# Patient Record
Sex: FEMALE | Race: WHITE | NOT HISPANIC OR LATINO | Employment: OTHER | URBAN - METROPOLITAN AREA
[De-identification: names, ages, dates, MRNs, and addresses within clinical notes are randomized per-mention and may not be internally consistent; named-entity substitution may affect disease eponyms.]

---

## 2017-09-23 ENCOUNTER — HOSPITAL ENCOUNTER (EMERGENCY)
Facility: HOSPITAL | Age: 42
Discharge: PSYCHIATRIC OR SUBSTANCE USE FACILITY | End: 2017-09-23
Attending: EMERGENCY MEDICINE
Payer: MEDICAID

## 2017-09-23 VITALS
OXYGEN SATURATION: 100 % | TEMPERATURE: 99 F | HEART RATE: 74 BPM | BODY MASS INDEX: 32.1 KG/M2 | RESPIRATION RATE: 18 BRPM | HEIGHT: 61 IN | WEIGHT: 170 LBS | DIASTOLIC BLOOD PRESSURE: 86 MMHG | SYSTOLIC BLOOD PRESSURE: 120 MMHG

## 2017-09-23 DIAGNOSIS — R45.851 DEPRESSION WITH SUICIDAL IDEATION: Primary | ICD-10-CM

## 2017-09-23 DIAGNOSIS — F32.A DEPRESSION WITH SUICIDAL IDEATION: Primary | ICD-10-CM

## 2017-09-23 DIAGNOSIS — F31.9 BIPOLAR DISEASE, CHRONIC: ICD-10-CM

## 2017-09-23 LAB
ALBUMIN SERPL BCP-MCNC: 3.6 G/DL
ALP SERPL-CCNC: 69 U/L
ALT SERPL W/O P-5'-P-CCNC: 10 U/L
AMPHET+METHAMPHET UR QL: NEGATIVE
ANION GAP SERPL CALC-SCNC: 11 MMOL/L
APAP SERPL-MCNC: 11 UG/ML
AST SERPL-CCNC: 16 U/L
BARBITURATES UR QL SCN>200 NG/ML: NORMAL
BASOPHILS # BLD AUTO: 0.03 K/UL
BASOPHILS NFR BLD: 0.6 %
BENZODIAZ UR QL SCN>200 NG/ML: NORMAL
BILIRUB SERPL-MCNC: 0.4 MG/DL
BILIRUB UR QL STRIP: NEGATIVE
BUN SERPL-MCNC: 7 MG/DL
BZE UR QL SCN: NORMAL
CALCIUM SERPL-MCNC: 9.4 MG/DL
CANNABINOIDS UR QL SCN: NEGATIVE
CHLORIDE SERPL-SCNC: 106 MMOL/L
CLARITY UR: CLEAR
CO2 SERPL-SCNC: 23 MMOL/L
COLOR UR: YELLOW
CREAT SERPL-MCNC: 1.2 MG/DL
CREAT UR-MCNC: 171.5 MG/DL
DIFFERENTIAL METHOD: ABNORMAL
EOSINOPHIL # BLD AUTO: 0.2 K/UL
EOSINOPHIL NFR BLD: 5 %
ERYTHROCYTE [DISTWIDTH] IN BLOOD BY AUTOMATED COUNT: 16.4 %
EST. GFR  (AFRICAN AMERICAN): >60 ML/MIN/1.73 M^2
EST. GFR  (NON AFRICAN AMERICAN): 55 ML/MIN/1.73 M^2
ETHANOL SERPL-MCNC: <10 MG/DL
GLUCOSE SERPL-MCNC: 113 MG/DL
GLUCOSE UR QL STRIP: NEGATIVE
HCT VFR BLD AUTO: 43.4 %
HGB BLD-MCNC: 14.3 G/DL
HGB UR QL STRIP: NEGATIVE
KETONES UR QL STRIP: NEGATIVE
LEUKOCYTE ESTERASE UR QL STRIP: NEGATIVE
LYMPHOCYTES # BLD AUTO: 1.8 K/UL
LYMPHOCYTES NFR BLD: 37.6 %
MCH RBC QN AUTO: 27.6 PG
MCHC RBC AUTO-ENTMCNC: 32.9 G/DL
MCV RBC AUTO: 84 FL
METHADONE UR QL SCN>300 NG/ML: NEGATIVE
MONOCYTES # BLD AUTO: 0.5 K/UL
MONOCYTES NFR BLD: 9.6 %
NEUTROPHILS # BLD AUTO: 2.3 K/UL
NEUTROPHILS NFR BLD: 47.2 %
NITRITE UR QL STRIP: NEGATIVE
OPIATES UR QL SCN: NORMAL
PCP UR QL SCN>25 NG/ML: NEGATIVE
PH UR STRIP: 5 [PH] (ref 5–8)
PLATELET # BLD AUTO: 322 K/UL
PMV BLD AUTO: 9 FL
POTASSIUM SERPL-SCNC: 3.7 MMOL/L
PROT SERPL-MCNC: 7 G/DL
PROT UR QL STRIP: NEGATIVE
RBC # BLD AUTO: 5.19 M/UL
SODIUM SERPL-SCNC: 140 MMOL/L
SP GR UR STRIP: 1.02 (ref 1–1.03)
T4 FREE SERPL-MCNC: 0.47 NG/DL
TOXICOLOGY INFORMATION: NORMAL
TSH SERPL DL<=0.005 MIU/L-ACNC: 69.89 UIU/ML
URN SPEC COLLECT METH UR: NORMAL
UROBILINOGEN UR STRIP-ACNC: NEGATIVE EU/DL
WBC # BLD AUTO: 4.81 K/UL

## 2017-09-23 PROCEDURE — 84439 ASSAY OF FREE THYROXINE: CPT

## 2017-09-23 PROCEDURE — 85025 COMPLETE CBC W/AUTO DIFF WBC: CPT

## 2017-09-23 PROCEDURE — 99285 EMERGENCY DEPT VISIT HI MDM: CPT

## 2017-09-23 PROCEDURE — 25000003 PHARM REV CODE 250: Performed by: EMERGENCY MEDICINE

## 2017-09-23 PROCEDURE — 80053 COMPREHEN METABOLIC PANEL: CPT

## 2017-09-23 PROCEDURE — 80307 DRUG TEST PRSMV CHEM ANLYZR: CPT

## 2017-09-23 PROCEDURE — 84443 ASSAY THYROID STIM HORMONE: CPT

## 2017-09-23 PROCEDURE — 81003 URINALYSIS AUTO W/O SCOPE: CPT

## 2017-09-23 PROCEDURE — 80329 ANALGESICS NON-OPIOID 1 OR 2: CPT

## 2017-09-23 PROCEDURE — 80320 DRUG SCREEN QUANTALCOHOLS: CPT

## 2017-09-23 RX ORDER — LEVOFLOXACIN 250 MG/1
250 TABLET ORAL DAILY
COMMUNITY

## 2017-09-23 RX ORDER — ESCITALOPRAM OXALATE 10 MG/1
10 TABLET ORAL DAILY
COMMUNITY

## 2017-09-23 RX ORDER — LEVOTHYROXINE SODIUM 200 UG/1
200 TABLET ORAL DAILY
Qty: 30 TABLET | Refills: 1 | Status: SHIPPED | OUTPATIENT
Start: 2017-09-23

## 2017-09-23 RX ORDER — LEVOTHYROXINE SODIUM 100 UG/1
200 TABLET ORAL
Status: DISCONTINUED | OUTPATIENT
Start: 2017-09-23 | End: 2017-09-23 | Stop reason: HOSPADM

## 2017-09-23 RX ADMIN — LEVOTHYROXINE SODIUM 200 MCG: 100 TABLET ORAL at 01:09

## 2017-09-23 NOTE — ED PROVIDER NOTES
"Encounter Date: 2017    SCRIBE #1 NOTE: I, Iwona Loreto, am scribing for, and in the presence of,  Micheal Wong MD. I have scribed the following portions of the note - Other sections scribed: HPI and ROS.       History     Chief Complaint   Patient presents with    Suicidal     Pt reports SI. "I want to kill myself." Reports plan to overdose. Hx depression, bipolar, drug abuse. Hx psychiatric care.      CC: Suicidal    HPI: This 42 y.o. Female with PMHx of bipolar depression, left carpal tunnel, COPD, DM, chronic back pain, thyroid disease, asthma, and anemia presents to the ED for evaluation because she is suicidal. Pt states "I want to kill myself." Pt plans to commit suicide by overdosing on drugs. Pt states she feels alone, and she lost the will to live because there has been a lot of death in her family. Pt adds "someone dies every year." Pt reports she found her fiance dead approximately 13 years ago. Then, her brother shot himself in the head approximately 10 years ago. After that, her mother  approximately 8 years ago. Then, her father  approximately 5 months ago. Pt states she has 2 children, but they have their own lives. She notes they visit her sometimes. Pt has not taken her daily medications for the past 2 weeks. Pt notes she receives counseling twice per week. Pt uses cocaine. Pt denies any other associated symptoms.       The history is provided by the patient. No  was used.     Review of patient's allergies indicates:  No Known Allergies  Past Medical History:   Diagnosis Date    Abnormal Pap smear of vagina     Anemia     Asthma     Bipolar depression     COPD (chronic obstructive pulmonary disease)     Diabetes mellitus     Thyroid disease     hypothyroidism     Past Surgical History:   Procedure Laterality Date     SECTION      CHOLECYSTECTOMY      HYSTERECTOMY      total    TUBAL LIGATION       History reviewed. No pertinent family " history.  Social History   Substance Use Topics    Smoking status: Current Some Day Smoker     Packs/day: 1.00     Years: 15.00     Types: Cigarettes    Smokeless tobacco: Never Used    Alcohol use Yes      Comment: occassioal     Review of Systems   Constitutional: Negative for chills, diaphoresis and fever.   HENT: Negative for ear pain and sore throat.    Eyes: Negative for pain.   Respiratory: Negative for cough and shortness of breath.    Cardiovascular: Negative for chest pain.   Gastrointestinal: Negative for abdominal pain, diarrhea, nausea and vomiting.   Genitourinary: Negative for dysuria.   Skin: Negative for rash.   Neurological: Negative for headaches.   Psychiatric/Behavioral: Positive for suicidal ideas (with plan to commit suicide by overdosing on drugs).       Physical Exam     Initial Vitals [09/23/17 1032]   BP Pulse Resp Temp SpO2   (!) 157/95 80 18 98.5 °F (36.9 °C) 97 %      MAP       115.67         Physical Exam  The patient was examined specifically for the following:   General:No significant distress, Good color, Warm and dry. Head and neck:Scalp atraumatic, Neck supple. Neurological:Appropriate conversation, Gross motor deficits. Eyes:Conjugate gaze, Clear corneas. ENT: No epistaxis. Cardiac: Regular rate and rhythm, Grossly normal heart tones. Pulmonary: Wheezing, Rales. Gastrointestinal: Abdominal tenderness, Abdominal distention. Musculoskeletal: Extremity deformity, Apparent pain with range of motion of the joints. Skin: Rash.   The findings on examination were normal except for the following: The patient is tearful despondent and suicidal.  ED Course   Procedures  Labs Reviewed   CBC W/ AUTO DIFFERENTIAL - Abnormal; Notable for the following:        Result Value    RDW 16.4 (*)     MPV 9.0 (*)     All other components within normal limits   COMPREHENSIVE METABOLIC PANEL - Abnormal; Notable for the following:     Glucose 113 (*)     eGFR if non  55 (*)     All other  components within normal limits   TSH - Abnormal; Notable for the following:     TSH 69.891 (*)     All other components within normal limits   T4, FREE - Abnormal; Notable for the following:     Free T4 0.47 (*)     All other components within normal limits   URINALYSIS   DRUG SCREEN PANEL, URINE EMERGENCY   ALCOHOL,MEDICAL (ETHANOL)   ACETAMINOPHEN LEVEL     Medical decision making: Given the above, this patient presents noncompliant with her thyroid medication.  I will begin her back on her levothyroxine 200 µg.  The patient has not been taking her medicines.  She is suicidal.  She is abusing drugs.  I will execute a physicians emergency commitment.  I believe this patient is free of medical emergencies that would prevent admission to a psychiatric hospital.                      Scribe Attestation:   Scribe #1: I performed the above scribed service and the documentation accurately describes the services I performed. I attest to the accuracy of the note.    Attending Attestation:           Physician Attestation for Scribe:  Physician Attestation Statement for Scribe #1: I, Micheal Wong MD, reviewed documentation, as scribed by Iwona Dangelo in my presence, and it is both accurate and complete.                 ED Course      Clinical Impression:   The primary encounter diagnosis was Depression with suicidal ideation. A diagnosis of Bipolar disease, chronic was also pertinent to this visit.                           Micheal Wong MD  09/24/17 2955

## 2017-09-23 NOTE — ED TRIAGE NOTES
Pt presents to the ED with c/o Suicidal thoughts. Pt states she had a plan to OD on drugs. Pt also reports she is addicted to rugs. Pt has had these thoughts since she was 14 years old. Pt is tearful. Pt has a hx of bipolar.

## 2017-09-23 NOTE — ED NOTES
PATIENT'S DAUGHTER (MEENAKSHI) [phone] WAS NOTIFIED OF HER TRANSFER TO [facility]

## 2017-09-23 NOTE — ED NOTES
CALLED [facility] FOR TRANSPORT TO [facility] IN [place] (SPOKE TO JOSESITO)

## 2017-09-23 NOTE — ED NOTES
Received report from TRESA Isaacs. Pt lying in bed, blue paper scrubs, belongings outside and sitter at bedside. ALBERT OSBORNE x 4. Will continue to monitor.

## 2017-12-23 ENCOUNTER — HOSPITAL ENCOUNTER (EMERGENCY)
Facility: HOSPITAL | Age: 42
Discharge: HOME | End: 2017-12-23
Payer: MEDICAID

## 2017-12-23 VITALS
OXYGEN SATURATION: 98 % | HEART RATE: 74 BPM | HEIGHT: 61 IN | WEIGHT: 170 LBS | BODY MASS INDEX: 32.1 KG/M2 | TEMPERATURE: 98 F | DIASTOLIC BLOOD PRESSURE: 91 MMHG | RESPIRATION RATE: 16 BRPM | SYSTOLIC BLOOD PRESSURE: 141 MMHG

## 2017-12-23 DIAGNOSIS — L02.91 CUTANEOUS ABSCESS, UNSPECIFIED SITE: Primary | ICD-10-CM

## 2017-12-23 PROCEDURE — 99283 EMERGENCY DEPT VISIT LOW MDM: CPT | Mod: 25

## 2017-12-23 PROCEDURE — 87070 CULTURE OTHR SPECIMN AEROBIC: CPT

## 2017-12-23 PROCEDURE — 25000003 PHARM REV CODE 250: Performed by: NURSE PRACTITIONER

## 2017-12-23 PROCEDURE — 10060 I&D ABSCESS SIMPLE/SINGLE: CPT | Mod: LT

## 2017-12-23 PROCEDURE — 87075 CULTR BACTERIA EXCEPT BLOOD: CPT

## 2017-12-23 RX ORDER — HYDROCODONE BITARTRATE AND ACETAMINOPHEN 5; 325 MG/1; MG/1
1 TABLET ORAL EVERY 6 HOURS PRN
Qty: 12 TABLET | Refills: 0 | Status: SHIPPED | OUTPATIENT
Start: 2017-12-23 | End: 2017-12-26

## 2017-12-23 RX ORDER — HYDROCODONE BITARTRATE AND ACETAMINOPHEN 5; 325 MG/1; MG/1
1 TABLET ORAL EVERY 6 HOURS PRN
Qty: 6 TABLET | Refills: 0 | Status: SHIPPED | OUTPATIENT
Start: 2017-12-23 | End: 2017-12-23 | Stop reason: CLARIF

## 2017-12-23 RX ORDER — LIDOCAINE HYDROCHLORIDE 10 MG/ML
5 INJECTION INFILTRATION; PERINEURAL
Status: COMPLETED | OUTPATIENT
Start: 2017-12-23 | End: 2017-12-23

## 2017-12-23 RX ORDER — QUETIAPINE FUMARATE 100 MG/1
200 TABLET, FILM COATED ORAL
COMMUNITY

## 2017-12-23 RX ORDER — HYDROCODONE BITARTRATE AND ACETAMINOPHEN 5; 325 MG/1; MG/1
1 TABLET ORAL
Status: COMPLETED | OUTPATIENT
Start: 2017-12-23 | End: 2017-12-23

## 2017-12-23 RX ORDER — MUPIROCIN 20 MG/G
1 OINTMENT TOPICAL
Status: COMPLETED | OUTPATIENT
Start: 2017-12-23 | End: 2017-12-23

## 2017-12-23 RX ADMIN — MUPIROCIN 22 G: 20 OINTMENT TOPICAL at 06:12

## 2017-12-23 RX ADMIN — HYDROCODONE BITARTRATE AND ACETAMINOPHEN 1 TABLET: 5; 325 TABLET ORAL at 06:12

## 2017-12-23 RX ADMIN — LIDOCAINE HYDROCHLORIDE 5 ML: 10 INJECTION, SOLUTION INFILTRATION; PERINEURAL at 06:12

## 2017-12-23 NOTE — ED TRIAGE NOTES
C/o abscess  To lt groin. Reports I&D at w. [facility] 4 days. Bactrim and another UKN abx prescribed. Taking as prescribed. Reports feeling hard and draining. Pulled packing out. Denies fever.

## 2017-12-23 NOTE — ED PROVIDER NOTES
"Encounter Date: 2017       History     Chief Complaint   Patient presents with    Wound Check     " i had a cyst or a boil about four days ago, i went to [facility] and they lanced it" reports she removed packing as instructed, present with no relief of symptoms, drainage/pain to L inner thigh      This is a 42-year-old female who presents today for emergent evaluation of a abscess to left posterior toward inner thigh.  She reports that last Monday began as a small diet and progressively got bigger to half-dollar size.  She reported having a fever as well.  She was seen at [facility] on Tuesday.  This was lanced, drained and packed.  She is placed on Bactrim and Clinda.  She reports that she was told to take the packing out Thursday.  She reports feeling better on Thursday as well.  Has been taking antibiotics as directed.  She states that since Thursday the pain has become worse.  It was initially draining pus however is not any drainage now.  She states that she has an increase in pressure and pain.  Has been unable to sit on this area the last day.  She is visibly upset and room and crying.  She denies systemic symptoms at this time.  Denies shortness breath, chest pain, leg pain, nausea, vomiting.  Patient reports she has had several of these in the past and was told that she was a carrier for staph/MRSA.  She reports her tetanus is up-to-date.  States her pain is 10/10 deep aching pressure.          Review of patient's allergies indicates:  No Known Allergies  Past Medical History:   Diagnosis Date    Abnormal Pap smear of vagina     Anemia     Asthma     Bipolar depression     COPD (chronic obstructive pulmonary disease)     Diabetes mellitus     Thyroid disease     hypothyroidism     Past Surgical History:   Procedure Laterality Date     SECTION      CHOLECYSTECTOMY      HYSTERECTOMY      total    TUBAL LIGATION       No family history on file.  Social History   Substance Use " Topics    Smoking status: Current Some Day Smoker     Packs/day: 1.00     Years: 15.00     Types: Cigarettes    Smokeless tobacco: Never Used    Alcohol use Yes      Comment: occassioal     Review of Systems   Constitutional: Negative for fever.   HENT: Negative for congestion, postnasal drip, rhinorrhea, sinus pressure and sore throat.    Respiratory: Negative for cough, chest tightness, shortness of breath and wheezing.    Cardiovascular: Negative for chest pain, palpitations and leg swelling.   Gastrointestinal: Negative for abdominal pain, constipation, diarrhea, nausea and vomiting.   Genitourinary: Negative for decreased urine volume, difficulty urinating and dysuria.   Musculoskeletal: Negative for arthralgias, back pain, gait problem, joint swelling, myalgias, neck pain and neck stiffness.   Skin: Positive for wound. Negative for rash.   Neurological: Negative for syncope, weakness, light-headedness and numbness.   Hematological: Negative for adenopathy. Does not bruise/bleed easily.       Physical Exam     Initial Vitals [12/23/17 1517]   BP Pulse Resp Temp SpO2   (!) 161/92 84 20 98.6 °F (37 °C) 98 %      MAP       115         Physical Exam    Nursing note and vitals reviewed.  Constitutional: Vital signs are normal. She appears well-developed and well-nourished.  Non-toxic appearance. She does not have a sickly appearance. She does not appear ill. No distress.   HENT:   Head: Normocephalic and atraumatic.   Eyes: Conjunctivae and EOM are normal. Pupils are equal, round, and reactive to light.   Neck: Normal range of motion.   Cardiovascular: Normal rate, regular rhythm and normal heart sounds.   No murmur heard.  Pulmonary/Chest: Effort normal and breath sounds normal. No respiratory distress. She has no decreased breath sounds. She has no wheezes. She exhibits no tenderness.   Abdominal: Soft. Normal appearance and bowel sounds are normal. She exhibits no distension and no mass. There is no  tenderness. There is no rebound and no guarding.   Musculoskeletal: Normal range of motion.   Lymphadenopathy: No inguinal adenopathy noted on the right or left side.   Neurological: She is alert and oriented to person, place, and time. She has normal strength and normal reflexes. No sensory deficit. GCS eye subscore is 4. GCS verbal subscore is 5. GCS motor subscore is 6.   Skin: Skin is warm and dry. Capillary refill takes less than 2 seconds. No pallor.        No drainage appreciated with gentle pressure     Psychiatric: She has a normal mood and affect. Her behavior is normal. Judgment and thought content normal.         ED Course   I & D - Incision and Drainage  Date/Time: 2017 11:13 PM  Location procedure was performed: [facility] EMERGENCY DEPARTMENT  Performed by: HAM TORRE  Authorized by: HAM TORRE   Consent Done: Yes  Consent: Verbal consent obtained.  Risks and benefits: risks, benefits and alternatives were discussed  Consent given by: patient  Patient understanding: patient states understanding of the procedure being performed  Patient identity confirmed:  and name  Type: abscess  Body area: lower extremity (left posterior inner thigh)  Anesthesia: local infiltration    Anesthesia:  Local Anesthetic: lidocaine 1% without epinephrine  Patient sedated: no  Scalpel size: 11  Incision type: single straight  Complexity: simple  Drainage: serosanguinous  Drainage amount: scant  Wound treatment: wound left open,  deloculation,  drainage and  expression of material  Packing material: 1/4 in gauze  Complications: No  Estimated blood loss (mL): 3  Specimens: Yes  Implants: No  Patient tolerance: Patient tolerated the procedure well with no immediate complications  Comments: Patient had previous abscess drained here proximal without days ago.  With through existing incision which had closed back up.  Small area of loculation noted.  Serosanguineous drainage.  Culture sent to lab.  Packing  placed.        Labs Reviewed   CULTURE, ANAEROBIC   CULTURE, AEROBIC  (SPECIFY SOURCE)                   APC / Resident Notes:   Philippe Woods is a 42 y.o. female who presents to the Emergency Department with abscess to left posterior toward inner thigh which began approximately 5 days ago.  She was seen at [place] emergency Department 4 days ago.  Area was lanced, drained and packed.  Packing was removed approximately 3 days ago in which patient states draining stopped.  She reports that she was placed on Bactrim, clindamycin and has been compliant with these medications.  She states she began to feel better 3 days ago however since packing was removed she has began to feel pressure and pain to the site.  She reports tetanus immunization up-to-date.    Physical Exam shows a non-toxic, afebrile, and well appearing female. Pertinent exam findings include a 3 x 3 cm area of induration.  0.5 cm stab incision to Center of area with scab intact.  No erythema, cellulitis, edema, streaking, adenopathy noted to area.  No drainage noted.  Extremely tender to palpation.  No reason to believe systemic involvement as patient is feeling better and remains afebrile.  I&D performed.  Local infiltration with lidocaine 1% without epi used.  Stab incision made through old incision.  There is small loculation noted with minimal amount of serosanguineous drainage.  Culture obtained.  Wound was packed with quarter-inch gauze.  Patient tolerated procedure without difficulty.    Vital Signs Are Reassuring. If available, previous records reviewed.   RESULTS: Wound culture- pending.      Will continue patient on current course of Bactrim and clindamycin as she states that she is feeling better than she was when she was placed on his medications as she has had resolution of fever and malaise.     My overall impression is cutaneous abscess. Differential diagnosis includes but is not limited to furuncle, carbuncles, cellulitis,  necrotizing fasciitis.       ED Course: Norco, lidocaine. D/C Meds: Continue Bactrim, clindamycin as directed.  U Clement nasal. Additional D/C Information: Keep packing in place until Tuesday, follow with PCP on Tuesday,  Wednesday at latest. The diagnosis, treatment plan, instructions for follow-up and reevaluation with PCP as well as ED return precautions were discussed and understanding was verbalized. All questions or concerns have been addressed. Patient was discharged home with an instructional sheet which gave not only information regarding the most likely diagnoses but also information regarding when to return to the emergency department for alarming symptoms and when to seek further care.      This case was discussed with  Dr. Wong who is in agreement with my assessment and plan.            Attending Attestation:     Physician Attestation Statement for NP/PA:   I discussed this assessment and plan of this patient with the NP/PA, but I did not personally examine the patient. The face to face encounter was performed by the NP/PA.                  ED Course      Clinical Impression:   The encounter diagnosis was Cutaneous abscess, unspecified site.    Disposition:   Disposition: Discharged  Condition: Stable                        Razia Figueroa NP  12/23/17 2324       Micheal Wong MD  12/30/17 9466

## 2017-12-24 NOTE — DISCHARGE INSTRUCTIONS
You must continue taking both antibiotics for entire course.    Shower with hibiclens each day x7 and then weekly thereafter.    Mupirocin Ointment to rober nares twice daily for 5 days with qtip.     You may remove packing on Tuesday.  Follow up with your primary care provider Tuesday or Wednesday.  Monitor for signs and symptoms of worsening infection.  Return in the ER for fever over 100.4, areas of redness or streaking on your leg or around wound.  Increasing pain unrelieved with pain relievers.

## 2017-12-25 LAB — BACTERIA SPEC AEROBE CULT: NORMAL

## 2017-12-27 LAB — BACTERIA SPEC ANAEROBE CULT: NORMAL

## 2019-04-05 ENCOUNTER — HOSPITAL ENCOUNTER (EMERGENCY)
Facility: HOSPITAL | Age: 44
Discharge: HOME | End: 2019-04-05
Attending: EMERGENCY MEDICINE
Payer: MEDICAID

## 2019-04-05 VITALS
HEIGHT: 61 IN | RESPIRATION RATE: 20 BRPM | WEIGHT: 230 LBS | HEART RATE: 96 BPM | TEMPERATURE: 99 F | BODY MASS INDEX: 43.43 KG/M2 | SYSTOLIC BLOOD PRESSURE: 107 MMHG | OXYGEN SATURATION: 95 % | DIASTOLIC BLOOD PRESSURE: 72 MMHG

## 2019-04-05 DIAGNOSIS — C20 RECTAL CANCER: ICD-10-CM

## 2019-04-05 DIAGNOSIS — K62.89 RECTAL PAIN: Primary | ICD-10-CM

## 2019-04-05 PROCEDURE — 99284 EMERGENCY DEPT VISIT MOD MDM: CPT | Mod: 25

## 2019-04-05 PROCEDURE — 63600175 PHARM REV CODE 636 W HCPCS: Performed by: EMERGENCY MEDICINE

## 2019-04-05 PROCEDURE — 96374 THER/PROPH/DIAG INJ IV PUSH: CPT

## 2019-04-05 RX ORDER — HYDROCODONE BITARTRATE AND ACETAMINOPHEN 10; 325 MG/1; MG/1
1 TABLET ORAL EVERY 8 HOURS PRN
Qty: 30 TABLET | Refills: 0 | Status: SHIPPED | OUTPATIENT
Start: 2019-04-05 | End: 2019-04-15

## 2019-04-05 RX ORDER — HYDROMORPHONE HYDROCHLORIDE 2 MG/ML
1 INJECTION, SOLUTION INTRAMUSCULAR; INTRAVENOUS; SUBCUTANEOUS
Status: COMPLETED | OUTPATIENT
Start: 2019-04-05 | End: 2019-04-05

## 2019-04-05 RX ORDER — LITHIUM CARBONATE 300 MG
300 TABLET ORAL 3 TIMES DAILY
COMMUNITY

## 2019-04-05 RX ORDER — HYDROCODONE BITARTRATE AND ACETAMINOPHEN 10; 325 MG/1; MG/1
1 TABLET ORAL
COMMUNITY
End: 2019-04-05 | Stop reason: SDUPTHER

## 2019-04-05 RX ADMIN — HYDROMORPHONE HYDROCHLORIDE 1 MG: 2 INJECTION, SOLUTION INTRAMUSCULAR; INTRAVENOUS; SUBCUTANEOUS at 08:04

## 2019-04-06 NOTE — ED TRIAGE NOTES
"Pt presents to ED c/o rectal pain and LLQ abd pain near her hip; pt says she was recently dx w/ anal cancer 2 weeks ago while she was in [place]; she says that she has been taking Norco 10 mg but took her last one today around 9 am; pain is 10/10; pt says she has also had chronic rectal bleeding for "some while" now; denies any other symptoms; in NAD; AAOx4; will continue to monitor  "

## 2019-04-06 NOTE — ED PROVIDER NOTES
Encounter Date: 2019    SCRIBE #1 NOTE: I, Catherine Garcia, am scribing for, and in the presence of,  Kirby Cole MD. I have scribed the following portions of the note - Other sections scribed: HPI, ROS.       History     Chief Complaint   Patient presents with    Rectal Pain     Patient reports that she was recently diagnosed with anal cancer in [place] 2 weeks ago. Pain states that she is having severe anal pain and is out of pain medication (norco). Patient reprots that she is having chronic rectal bleeding and lower left abdominal pains.     CC: Rectal Pain     43 year old female  has a past medical history of Abnormal Pap smear of vagina, Anemia, Asthma, Bipolar depression, COPD (chronic obstructive pulmonary disease), Diabetes mellitus, and Thyroid disease presents to the ED for evaluation of rectal pain. Pt reports she was diagnosed w/ anal cancer 2 weeks ago in [place]. She has since ran out of her Norco medications and reports severe pain; she cannot lay down on her rectum due to the pain. She last took her Norco today at 9 am. Pt has not had any anal bleeding today. She has moved back to [place] to be with family and has not received Medicaid yet. She does not currently have an oncologist due to her insurance issues. Pt currently takes Depakot, Lexapro, Lithium, and Seroquil.     The history is provided by the patient. No  was used.     Review of patient's allergies indicates:  No Known Allergies  Past Medical History:   Diagnosis Date    Abnormal Pap smear of vagina     Anemia     Asthma     Bipolar depression     COPD (chronic obstructive pulmonary disease)     Diabetes mellitus     Thyroid disease     hypothyroidism     Past Surgical History:   Procedure Laterality Date    ABLATION ENDOMETRIAL THERMAL - NOVASURE N/A 10/20/2015    [facility]     SECTION      CHOLECYSTECTOMY      CONE BIOPSY N/A 10/20/2015    [facility]    HYSTERECTOMY      total    HYSTERECTOMY-ABDOMINAL-TOTAL (MARION) N/A 2/16/2016    [facility] [place]    [id number] N/A 10/20/2015    Performed by [facility] [place]    [id number] (BSO) Bilateral 2/16/2016    Performed by [facility] at [facility] [place]    TUBAL LIGATION  1997     History reviewed. No pertinent family history.  Social History     Tobacco Use    Smoking status: Current Some Day Smoker     Packs/day: 1.00     Years: 15.00     Pack years: 15.00     Types: Cigarettes    Smokeless tobacco: Never Used   Substance Use Topics    Alcohol use: Yes     Comment: occassionally    Drug use: Not Currently     Review of Systems   Constitutional: Negative for fever.   HENT: Negative for sore throat.    Respiratory: Negative for shortness of breath.    Cardiovascular: Negative for chest pain.   Gastrointestinal: Positive for rectal pain. Negative for nausea.   Genitourinary: Negative for dysuria.   Musculoskeletal: Negative for back pain.   Skin: Negative for rash.   Neurological: Negative for weakness.   Hematological: Does not bruise/bleed easily.       Physical Exam     Initial Vitals [04/05/19 1859]   BP Pulse Resp Temp SpO2   (!) 131/90 102 20 99.2 °F (37.3 °C) 97 %      MAP       --         Physical Exam    Nursing note and vitals reviewed.  Constitutional: She appears well-developed and well-nourished.   Eyes: EOM are normal. Pupils are equal, round, and reactive to light.   Neck: Normal range of motion. Neck supple. No thyromegaly present. No JVD present.   Cardiovascular: Normal rate, regular rhythm, normal heart sounds and intact distal pulses. Exam reveals no gallop and no friction rub.    No murmur heard.  Pulmonary/Chest: Breath sounds normal. No respiratory distress.   Abdominal: Soft. Bowel sounds are normal. She exhibits no distension and no mass. There is no tenderness.  There is no rebound and no guarding.   Genitourinary:   Genitourinary Comments: No abscess or drainage. Unable to tolerate MADHAVI.    Musculoskeletal: Normal range of motion. She exhibits no edema or tenderness.   Neurological: She is alert and oriented to person, place, and time. She has normal strength.   Skin: Skin is warm and dry.         ED Course   Procedures  Labs Reviewed - No data to display       Imaging Results    None           Paperwork from [place] reviewed that confirms rectal cancer.  Needs close follow up with Colorectal surgery and Oncology. Will treat pain and prescribe pain control.           Scribe Attestation:   Scribe #1: I performed the above scribed service and the documentation accurately describes the services I performed. I attest to the accuracy of the note.    Attending Attestation:           Physician Attestation for Scribe:  Physician Attestation Statement for Scribe #1: I, Kirby Cole MD, reviewed documentation, as scribed by Catherine Garcia in my presence, and it is both accurate and complete.                    Clinical Impression:       ICD-10-CM ICD-9-CM   1. Rectal pain K62.89 569.42   2. Rectal cancer C20 154.1                                Kirby Cole MD  04/24/19 1431